# Patient Record
Sex: FEMALE | Race: OTHER | HISPANIC OR LATINO
[De-identification: names, ages, dates, MRNs, and addresses within clinical notes are randomized per-mention and may not be internally consistent; named-entity substitution may affect disease eponyms.]

---

## 2017-03-27 ENCOUNTER — LABORATORY RESULT (OUTPATIENT)
Age: 52
End: 2017-03-27

## 2017-03-27 ENCOUNTER — APPOINTMENT (OUTPATIENT)
Dept: OBGYN | Facility: CLINIC | Age: 52
End: 2017-03-27

## 2017-03-27 VITALS
HEIGHT: 65 IN | WEIGHT: 144 LBS | BODY MASS INDEX: 23.99 KG/M2 | SYSTOLIC BLOOD PRESSURE: 110 MMHG | DIASTOLIC BLOOD PRESSURE: 80 MMHG

## 2017-03-27 DIAGNOSIS — Z78.9 OTHER SPECIFIED HEALTH STATUS: ICD-10-CM

## 2017-03-27 DIAGNOSIS — Z82.49 FAMILY HISTORY OF ISCHEMIC HEART DISEASE AND OTHER DISEASES OF THE CIRCULATORY SYSTEM: ICD-10-CM

## 2017-03-27 DIAGNOSIS — N95.2 POSTMENOPAUSAL ATROPHIC VAGINITIS: ICD-10-CM

## 2017-03-27 DIAGNOSIS — Z83.3 FAMILY HISTORY OF DIABETES MELLITUS: ICD-10-CM

## 2017-03-27 DIAGNOSIS — Z01.419 ENCOUNTER FOR GYNECOLOGICAL EXAMINATION (GENERAL) (ROUTINE) W/OUT ABNORMAL FINDINGS: ICD-10-CM

## 2017-03-27 DIAGNOSIS — Z87.19 PERSONAL HISTORY OF OTHER DISEASES OF THE DIGESTIVE SYSTEM: ICD-10-CM

## 2017-03-27 DIAGNOSIS — Z80.3 FAMILY HISTORY OF MALIGNANT NEOPLASM OF BREAST: ICD-10-CM

## 2017-03-28 ENCOUNTER — LABORATORY RESULT (OUTPATIENT)
Age: 52
End: 2017-03-28

## 2018-03-26 ENCOUNTER — APPOINTMENT (OUTPATIENT)
Dept: OBGYN | Facility: CLINIC | Age: 53
End: 2018-03-26

## 2020-12-09 ENCOUNTER — RESULT REVIEW (OUTPATIENT)
Age: 55
End: 2020-12-09

## 2021-03-16 ENCOUNTER — LABORATORY RESULT (OUTPATIENT)
Age: 56
End: 2021-03-16

## 2021-03-17 ENCOUNTER — APPOINTMENT (OUTPATIENT)
Dept: OBGYN | Facility: CLINIC | Age: 56
End: 2021-03-17
Payer: COMMERCIAL

## 2021-03-17 VITALS — DIASTOLIC BLOOD PRESSURE: 82 MMHG | WEIGHT: 139 LBS | BODY MASS INDEX: 23.13 KG/M2 | SYSTOLIC BLOOD PRESSURE: 128 MMHG

## 2021-03-17 PROCEDURE — 99396 PREV VISIT EST AGE 40-64: CPT

## 2021-03-17 PROCEDURE — 99072 ADDL SUPL MATRL&STAF TM PHE: CPT

## 2021-03-17 NOTE — HISTORY OF PRESENT ILLNESS
[TextBox_4] : Patient is a 55  year old female who presents for her annual exam.  Reports no postmenopausal bleeding, no abdominal or pelvic pain, change in discharge, change in bowel or bladder habits or any other concerns.  Due for pap and mammo.

## 2023-03-13 ENCOUNTER — APPOINTMENT (OUTPATIENT)
Dept: ORTHOPEDIC SURGERY | Facility: CLINIC | Age: 58
End: 2023-03-13
Payer: COMMERCIAL

## 2023-03-13 ENCOUNTER — NON-APPOINTMENT (OUTPATIENT)
Age: 58
End: 2023-03-13

## 2023-03-13 VITALS
HEART RATE: 64 BPM | DIASTOLIC BLOOD PRESSURE: 82 MMHG | SYSTOLIC BLOOD PRESSURE: 145 MMHG | WEIGHT: 142 LBS | BODY MASS INDEX: 23.66 KG/M2 | HEIGHT: 65 IN

## 2023-03-13 PROCEDURE — 99204 OFFICE O/P NEW MOD 45 MIN: CPT

## 2023-03-13 RX ORDER — ESTRADIOL 0.1 MG/G
0.1 CREAM VAGINAL
Qty: 1 | Refills: 6 | Status: DISCONTINUED | COMMUNITY
Start: 2017-03-27 | End: 2023-03-13

## 2023-03-13 RX ORDER — IBUPROFEN 800 MG/1
TABLET, FILM COATED ORAL
Refills: 0 | Status: ACTIVE | COMMUNITY

## 2023-03-13 RX ORDER — CYCLOBENZAPRINE HYDROCHLORIDE 10 MG/1
10 TABLET, FILM COATED ORAL 3 TIMES DAILY
Qty: 50 | Refills: 2 | Status: ACTIVE | COMMUNITY
Start: 2023-03-13 | End: 1900-01-01

## 2023-03-13 NOTE — DISCUSSION/SUMMARY
[4 Weeks] : in 4 weeks [Medication Risks Reviewed] : Medication risks reviewed [de-identified] : I expressed to the patient that her symptoms to her legs may be more consistent with the hip than her back. I explained to the patient that since there is currently no significant nerve damage at this time, I advised her that she should try physical therapy for 4 weeks and see if she has any improvement to her symptoms. If she does not find any improvement to her symptoms, we will further discuss getting X-Rays and potentially an MRI for further evaluation & treatment. The patient will follow-up in 1 month.\par \par

## 2023-03-13 NOTE — ADDENDUM
[FreeTextEntry1] : I, Shaun Robledo Jr, documented this note as a scribe on the behalf of Dr. Anton Degroot MD.

## 2023-03-13 NOTE — HISTORY OF PRESENT ILLNESS
RN/RN bedside report done. Safety checks complete. Family and pt sleeping quietly. Call light within reach.    [___ mths] : [unfilled] month(s) ago [7] : a minimum pain level of 7/10 [9] : a maximum pain level of 9/10 [Intermit.] : ~He/She~ states the symptoms seem to be intermittent [de-identified] : A 57 year old female presents an initial visit for back pain with no known injury. She states that she currently is experiencing midline back pain along with radicular symptoms that radiates down the right leg mainly ,along with numbness to her right lower extremities & intermittent weakness.  She had back pain prior to 8 months ago, but has became more severe in the past 8 months. She has previously had used lidocaine patches to help aid her symptoms. She states she saw a doctor in [facility] and had an MRI done. She was diagnosed with sciatica pain and was advised to rest. She denies any issues with diabetes or thyroid disease. \par

## 2023-03-13 NOTE — PHYSICAL EXAM
[1+] : left patella 1+ [2+] : left ankle jerk 2+ [LE] : Sensory: Intact in bilateral lower extremities [Normal] : Oriented to person, place, and time, insight and judgement were intact and the affect was normal [de-identified] : Positive SLR test on the right, negative on the Left, Positive Bowstring sign on the RIght, Negative on the left. Heel & Toe Walk Intact [de-identified] : Could not open prior imaging due to difficulty with the computer.

## 2023-03-13 NOTE — REASON FOR VISIT
[Back Pain] : back pain [Initial Visit] : an initial visit for [No Fault] : this visit is related to no fault  [Radiculopathy] : radiculopathy [Other: _____] : [unfilled]

## 2023-04-12 ENCOUNTER — APPOINTMENT (OUTPATIENT)
Dept: PAIN MANAGEMENT | Facility: CLINIC | Age: 58
End: 2023-04-12
Payer: COMMERCIAL

## 2023-04-12 VITALS — BODY MASS INDEX: 23.32 KG/M2 | HEIGHT: 65 IN | WEIGHT: 140 LBS

## 2023-04-12 DIAGNOSIS — M54.17 RADICULOPATHY, LUMBOSACRAL REGION: ICD-10-CM

## 2023-04-12 PROCEDURE — 99203 OFFICE O/P NEW LOW 30 MIN: CPT

## 2023-04-12 NOTE — PHYSICAL EXAM
[de-identified] : Gen: NAD\par Gait: antalgic\par Psych:  Mood appropriate for given condition\par ROM: limited AROM of the L spine in all planes, full ROM at ankles, knees and hips  \par Sensation: decreased to lt touch over L/R leg, otherwise intact to light touch in all dermatomes tested from L2-S1 bilaterally\par Reflexes: 2+ b/l patella and Achilles\par Palpation: Diffusely tender over lumbar paraspinals  -TTP over the b/l greater trochanters and bilateral SI joint\par Provacative tests: +SLR, and seated root (slump test) on the L/R, neg Salmeron, GINNY testing, FADIR testing\par \par 				Right	Left\par L2/3	Hip flexion		 5	 5\par L3/4	Knee Extension		 5	 5\par L4/5	Ankle Dorsiflexion 	 5	 5\par L5/S1	Great toe extension	 5	 5\par L4/5	Inversion		 5	 5\par L5/S1	Eversion		 5	 5\par L5/S1	Hip Abudction		 3	 3\par \par \par

## 2023-04-12 NOTE — DISCUSSION/SUMMARY
[de-identified] : \par After discussing various treatment options with the patient including but not limited to oral medications, physical therapy, exercise, modalities as well as interventional spinal injections, we have decided with the following plan: \par \par - pharmacological: encouraged to start flexeril 10mg TID prn\par - Imaging: XR L-spine 4/12/23 obtained. will consider MRI L-spine after conservative care completed\par - Interventional: consider Lumbar Epidural Steroid injection after MRI completed.  \par - rehabilitative: c/w HEP/PT \par - encouraged close follow up with Ortho spine (Dr. Degroot)\par - follow up 3-4 weeks or sooner if MRI L-spine obtained\par \par Anshu Carrasquillo MD\par \par

## 2023-04-12 NOTE — HISTORY OF PRESENT ILLNESS
[Lower back] : lower back [Right Leg] : right leg [8] : 8 [3] : 3 [Radiating] : radiating [Sharp] : sharp [Shooting] : shooting [Stabbing] : stabbing [Tingling] : tingling [Constant] : constant [Household chores] : household chores [Leisure] : leisure [Social interactions] : social interactions [Meds] : meds [Sitting] : sitting [Standing] : standing [Bending forward] : bending forward [FreeTextEntry1] : Initial HPI 04/12/2023: \par Ms. SMITH is a 57 year old F who presents for initial evaluation for low back and leg pain. Pain started 1 year ago and pain rated 7/10 or higher. It is not associated with any inciting injury. Pain radiates to down the R>L leg. Pain is described as shooting and electric shock when radiating.  Pain is worsened with sitting, coughing and bearing down. Patient has failed conservative treatment with acetaminophen, NSAIDs, muscle relaxants, gabapentin and physical therapy/HEP. Pain is causing significant functional limitation resulting in diminished quality of life and impaired age appropriate ADL's. Patient denies loss of bowel/bladder function, new motor deficits, fevers, or chills. There is associated numbness/tingling. She has had TPI from PCP in the past with minimal alleviation. \par \par Pain Medications: ibuprofen \par Past Injections: None\par Spine surgery: none\par Blood thinners: none\par \par \par  [] : no [FreeTextEntry6] : numbness  [FreeTextEntry9] : ibuprofen  [de-identified] : activity

## 2023-04-17 ENCOUNTER — APPOINTMENT (OUTPATIENT)
Dept: ORTHOPEDIC SURGERY | Facility: CLINIC | Age: 58
End: 2023-04-17
Payer: COMMERCIAL

## 2023-04-17 VITALS
BODY MASS INDEX: 22.99 KG/M2 | HEART RATE: 80 BPM | WEIGHT: 138 LBS | DIASTOLIC BLOOD PRESSURE: 80 MMHG | HEIGHT: 65 IN | SYSTOLIC BLOOD PRESSURE: 140 MMHG

## 2023-04-17 DIAGNOSIS — M54.16 RADICULOPATHY, LUMBAR REGION: ICD-10-CM

## 2023-04-17 PROCEDURE — 99213 OFFICE O/P EST LOW 20 MIN: CPT

## 2023-04-17 NOTE — REASON FOR VISIT
[Follow-Up Visit] : a follow-up visit for [No Fault] : this visit is related to no fault  [Back Pain] : back pain [Radiculopathy] : radiculopathy

## 2023-04-17 NOTE — DISCUSSION/SUMMARY
[2 Weeks] : in 2 weeks [de-identified] : I expressed to the patient that since she has not seen significant improvement with attending physical therapy at this time, we will look towards obtaining an MRI for further evaluation. The patient was provided with a script to obtain an MRI and will follow-up in 1-2 weeks to review the results of the MRI. I advised the patient to continue physical therapy if she has seen improvement with physical therapy.

## 2023-04-17 NOTE — HISTORY OF PRESENT ILLNESS
[Worsening] : worsening [6] : a current pain level of 6/10 [Intermit.] : ~He/She~ states the symptoms seem to be intermittent [de-identified] : Patient presents a follow-up visit for back pain & radiculopathy, related to no fault. the patient reports at this time she has seen slight relief with physical therapy at this time, approximately 30% improvement. She still currently experiences numbness & tingling to her right lower extremities and muscle spasms intermittently. She currently takes cyclobenzaprine to aid her current symptoms.\par \par

## 2023-04-17 NOTE — PHYSICAL EXAM
[LE] : Sensory: Intact in bilateral lower extremities [1+] : left patella 1+ [2+] : left ankle jerk 2+ [Normal] : Oriented to person, place, and time, insight and judgement were intact and the affect was normal

## 2023-05-07 ENCOUNTER — OUTPATIENT (OUTPATIENT)
Dept: OUTPATIENT SERVICES | Facility: HOSPITAL | Age: 58
LOS: 1 days | End: 2023-05-07
Payer: COMMERCIAL

## 2023-05-07 ENCOUNTER — APPOINTMENT (OUTPATIENT)
Dept: MRI IMAGING | Facility: CLINIC | Age: 58
End: 2023-05-07

## 2023-05-07 ENCOUNTER — RESULT REVIEW (OUTPATIENT)
Age: 58
End: 2023-05-07

## 2023-05-07 DIAGNOSIS — M54.16 RADICULOPATHY, LUMBAR REGION: ICD-10-CM

## 2023-05-07 PROCEDURE — 72148 MRI LUMBAR SPINE W/O DYE: CPT | Mod: 26

## 2023-05-07 PROCEDURE — 72148 MRI LUMBAR SPINE W/O DYE: CPT

## 2023-05-15 ENCOUNTER — APPOINTMENT (OUTPATIENT)
Dept: ORTHOPEDIC SURGERY | Facility: CLINIC | Age: 58
End: 2023-05-15
Payer: COMMERCIAL

## 2023-05-15 VITALS — HEART RATE: 80 BPM | SYSTOLIC BLOOD PRESSURE: 132 MMHG | DIASTOLIC BLOOD PRESSURE: 78 MMHG

## 2023-05-15 DIAGNOSIS — M48.062 SPINAL STENOSIS, LUMBAR REGION WITH NEUROGENIC CLAUDICATION: ICD-10-CM

## 2023-05-15 PROCEDURE — 99214 OFFICE O/P EST MOD 30 MIN: CPT

## 2023-05-15 NOTE — HISTORY OF PRESENT ILLNESS
[de-identified] : Patient presents a follow-up visit to review the results of a recent MRI of the Lumbar Spine performed on May 7th, 2023. She states that her symptoms have improved since her last visit after taking the medication provided and attending physical therapy. The patient currently take cyclobenzaprine to aid her symptoms. SHe reports no other significant changes to her symptoms at this time.

## 2023-05-15 NOTE — PHYSICAL EXAM
[LE] : Sensory: Intact in bilateral lower extremities [Normal] : Oriented to person, place, and time, insight and judgement were intact and the affect was normal [de-identified] : MRI Evaluation of the Lumbar Spine performed on 5/7/23 shows a Mild disc Bulge at L5-S1 producing mild foraminal stenosis with the right side more severe than the left. Mild Central Stenosis at L5-S1.  Personal review shows mild to moderate stenosis most marked at L3-4 and L4-5.\par

## 2023-05-15 NOTE — DISCUSSION/SUMMARY
[PRN] : PRN [de-identified] : I expressed to the patient her symptoms are consistent with Lumbar stenosis, most pertaining to L 5-S 1 base don her MRI findings. Activity modifications were reviewed with the patient in great detail. I advised that the patient should continue their current course of physical therapy at this time.  The patient has found significant alleviation with medication will obtain this from her primary care physician in the future.  The patient should follow-up with me as needed.

## 2023-05-15 NOTE — REASON FOR VISIT
[Follow-Up Visit] : a follow-up visit for [Back Pain] : back pain [Radiculopathy] : radiculopathy [FreeTextEntry2] : MRI review

## 2023-05-15 NOTE — ADDENDUM
[FreeTextEntry1] : I, Shaun Robledo , acted solely as a scribe for Dr. Anton Degroot on this date 05/15/2023 .\par \par All medical record entries made by the Scribe were at my, Dr. Anton Degroot, direction and personally dictated by me on 05/15/2023. I have reviewed the chart and agree that the record accurately reflects my personal performance of the history, physical exam, assessment and plan. I have also personally directed, reviewed, and agreed with the chart.\par

## 2023-05-24 ENCOUNTER — APPOINTMENT (OUTPATIENT)
Dept: PAIN MANAGEMENT | Facility: CLINIC | Age: 58
End: 2023-05-24